# Patient Record
Sex: FEMALE | Employment: FULL TIME | URBAN - METROPOLITAN AREA
[De-identification: names, ages, dates, MRNs, and addresses within clinical notes are randomized per-mention and may not be internally consistent; named-entity substitution may affect disease eponyms.]

---

## 2023-02-17 ENCOUNTER — OFFICE VISIT (OUTPATIENT)
Dept: OCCUPATIONAL MEDICINE | Age: 53
End: 2023-02-17

## 2023-02-17 VITALS — HEART RATE: 85 BPM | OXYGEN SATURATION: 96 % | TEMPERATURE: 98.5 F | RESPIRATION RATE: 18 BRPM

## 2023-02-17 DIAGNOSIS — H65.91 MIDDLE EAR EFFUSION, RIGHT: Primary | ICD-10-CM

## 2023-02-17 RX ORDER — IPRATROPIUM BROMIDE 42 UG/1
2 SPRAY, METERED NASAL 4 TIMES DAILY
COMMUNITY
Start: 2022-11-28

## 2023-02-17 RX ORDER — ESTRADIOL 0.03 MG/D
1 FILM, EXTENDED RELEASE TRANSDERMAL
COMMUNITY
Start: 2022-05-05

## 2023-02-17 RX ORDER — LEVOTHYROXINE SODIUM 0.07 MG/1
75 TABLET ORAL DAILY
COMMUNITY
Start: 2020-03-17

## 2023-02-17 RX ORDER — SUMATRIPTAN 50 MG/1
50 TABLET, FILM COATED ORAL
COMMUNITY
Start: 2022-05-03

## 2023-02-17 RX ORDER — VENLAFAXINE HYDROCHLORIDE 75 MG/1
75 CAPSULE, EXTENDED RELEASE ORAL DAILY
COMMUNITY
Start: 2023-01-04

## 2023-02-17 RX ORDER — BENZONATATE 200 MG/1
200 CAPSULE ORAL 3 TIMES DAILY PRN
COMMUNITY
Start: 2022-10-31

## 2023-02-17 RX ORDER — FLUTICASONE PROPIONATE 50 MCG
2 SPRAY, SUSPENSION (ML) NASAL DAILY
COMMUNITY
Start: 2021-12-01

## 2023-02-17 ASSESSMENT — ENCOUNTER SYMPTOMS
COUGH: 1
CHEST TIGHTNESS: 0
RHINORRHEA: 1
SHORTNESS OF BREATH: 0

## 2023-02-17 NOTE — PROGRESS NOTES
PROGRESS NOTE    SUBJECTIVE:   Mary Jo Oneal is a 46 y.o. female seen for ____. Patient presents to clinic with complaints of right ear discomfort. She states approximately 2 weeks ago she began with UR symptoms. Last Friday she began having a right ear ache that by Saturday had progressed to sharp pains that were worsened with movement. She was evaluated via [facility] and given a 7 day RX for Augmentin. She has 3 days left in to complete her course of antibiotics. She states that today she is continuing to experience right ear fullness, pressure, muffled hearing. She states she feels fluid shift with movement and at times will feel dizzy with position changes. She denies any sharp, stabbing pains or drainage in the right ear. She denies any fever. She states she still has some nasal congestion, runny nose, and sneezing. She is prone to seasonal allergies in the spring. She is taking Motrin as needed but no other OTC medications currently. Current Outpatient Medications   Medication Sig Dispense Refill    venlafaxine (EFFEXOR XR) 75 MG extended release capsule Take 75 mg by mouth daily      SUMAtriptan (IMITREX) 50 MG tablet Take 50 mg by mouth once as needed      levothyroxine (SYNTHROID) 75 MCG tablet Take 75 mcg by mouth daily      ipratropium (ATROVENT) 0.06 % nasal spray 2 sprays by Nasal route 4 times daily      fluticasone (FLONASE) 50 MCG/ACT nasal spray 2 sprays by Nasal route daily      estradiol 0.025 MG/24HR PTTW Place 1 patch onto the skin Twice a Week      benzonatate (TESSALON) 200 MG capsule Take 200 mg by mouth 3 times daily as needed       No current facility-administered medications for this visit. No Known Allergies    Social History     Tobacco Use    Smoking status: Never    Smokeless tobacco: Never   Vaping Use    Vaping Use: Never used        Review of Systems   Constitutional:  Negative for chills and fever.    HENT:  Positive for congestion, ear discharge (on sunday when lying; resolved), ear pain (right), postnasal drip, rhinorrhea and sneezing. Respiratory:  Positive for cough (resolved). Negative for chest tightness and shortness of breath. Musculoskeletal:  Negative for myalgias. Allergic/Immunologic: Positive for environmental allergies. Neurological:  Positive for dizziness (with movement). Negative for headaches. OBJECTIVE:  Pulse 85   Temp 98.5 °F (36.9 °C) (Oral)   Resp 18   SpO2 96%      No results found for this visit on 02/17/23. Physical Exam  Constitutional:       General: She is not in acute distress. Appearance: Normal appearance. She is well-developed. She is not ill-appearing. HENT:      Right Ear: Ear canal and external ear normal. A middle ear effusion (clear; all landmarks visible) is present. There is impacted cerumen (removed with ear curette; tolerated well. ). Tympanic membrane is erythematous (mild). Tympanic membrane is not retracted or bulging. Left Ear: Tympanic membrane, ear canal and external ear normal.      Nose: Mucosal edema, congestion and rhinorrhea present. Right Turbinates: Enlarged. Left Turbinates: Enlarged. Mouth/Throat:      Lips: Pink. Mouth: Mucous membranes are moist.      Pharynx: Oropharynx is clear. Posterior oropharyngeal erythema (mild) present. No pharyngeal swelling or oropharyngeal exudate. Comments: Some post nasal drip precipitated in the posterior pharynx. Cardiovascular:      Rate and Rhythm: Normal rate and regular rhythm. Pulses: Normal pulses. Pulmonary:      Effort: Pulmonary effort is normal.      Breath sounds: Normal breath sounds. Skin:     General: Skin is warm and dry. Neurological:      Mental Status: She is alert and oriented to person, place, and time.    Psychiatric:         Attention and Perception: Attention normal.         Mood and Affect: Mood and affect normal.         Speech: Speech normal.         Behavior: Behavior normal. Behavior is cooperative.         Thought Content: Thought content normal.         Cognition and Memory: Cognition and memory normal.         Judgment: Judgment normal.       ASSESSMENT and PLAN    Diagnoses and all orders for this visit:    Middle ear effusion, right      Educated patient that symptoms are likely result of fluid behind the ear that is related to her recent upper respiratory illness. Discussed with patient OTC management of symptoms including Zyrtec and Flonase daily, OTC anti-inflammatory for comfort and decongestant use conservatively as needed for next day or two, saline nasal spray, increased fluid intake, cool mist humidifier, warm steam showers, elevating the head of the bed at night, avoiding allergens. Educated to complete the course of antibiotics that she is currently prescribed.  Risks/benefits, side effects of all medications discussed. Discussed symptoms that would warrant more urgent/emergent evaluation including but not limited to fever, worsening pain, sudden relief of pain, drainage from affected ear, loss of hearing. Encouraged patient to return to clinic for any new or worsening symptoms or concerns. Patient verbalized understanding and is agreeable with the outlined plan of care.     Counseled on benefits of having a primary care provider which includes, but is not limited to, continuity of care and having a medical home when concerns arise. Also enforced that onsite clinic policy states that we are not to take the place of a primary care provider, pt verbalized understanding.     I have reviewed the patient's medication list, past medical, family, social, and surgical history in detail and updated the patient record appropriately.    Davin Zelaya, APRN - CNP

## 2023-03-03 ENCOUNTER — OFFICE VISIT (OUTPATIENT)
Dept: OCCUPATIONAL MEDICINE | Age: 53
End: 2023-03-03

## 2023-03-03 VITALS — HEART RATE: 99 BPM | OXYGEN SATURATION: 97 % | RESPIRATION RATE: 18 BRPM | TEMPERATURE: 99 F

## 2023-03-03 DIAGNOSIS — H66.91 ACUTE OTITIS MEDIA, RIGHT: Primary | ICD-10-CM

## 2023-03-03 RX ORDER — CEFDINIR 300 MG/1
300 CAPSULE ORAL 2 TIMES DAILY
Qty: 20 CAPSULE | Refills: 0 | Status: SHIPPED | OUTPATIENT
Start: 2023-03-03 | End: 2023-03-13

## 2023-03-03 ASSESSMENT — ENCOUNTER SYMPTOMS
CHEST TIGHTNESS: 0
SHORTNESS OF BREATH: 0
COUGH: 0

## 2023-03-03 NOTE — PROGRESS NOTES
PROGRESS NOTE    SUBJECTIVE:   Soy Barraza is a 46 y.o. female seen for ____. Patient presents to clinic with complaints of right ear pain. She was seen in clinic on 2/17/2023 for similar symptoms and associated upper respiratory symptoms; diagnosed with middle ear effusion at that time and encouraged OTC medication/home interventions. At the time of her visit on 2/17/2023 she was finishing a course of Augmentin that had been prescribed to her by a [facility] for her upper respiratory symptoms. On 2/22/2023 she was seen by her PCP and was subsequently treated for deferred right ear pain and effusion with a steroid dose pack. She was also given a Z-Pack at that time. She completed the steroid and Z-Pack on Monday. She states she continued to have a feeling of fullness in her right ear and muffled hearing. Yesterday she states she began having intermittent dull, aching pain in the right ear as well as intermittent \"popping\". She is continuing to have some lingering upper respiratory symptoms including nasal congestion. She is quite prone to seasonal allergies and is still taking Zyrtec and Flonase daily. Current Outpatient Medications   Medication Sig Dispense Refill    cefdinir (OMNICEF) 300 MG capsule Take 1 capsule by mouth 2 times daily for 10 days 20 capsule 0    venlafaxine (EFFEXOR XR) 75 MG extended release capsule Take 75 mg by mouth daily      SUMAtriptan (IMITREX) 50 MG tablet Take 50 mg by mouth once as needed      levothyroxine (SYNTHROID) 75 MCG tablet Take 75 mcg by mouth daily      ipratropium (ATROVENT) 0.06 % nasal spray 2 sprays by Nasal route 4 times daily      fluticasone (FLONASE) 50 MCG/ACT nasal spray 2 sprays by Nasal route daily      estradiol 0.025 MG/24HR PTTW Place 1 patch onto the skin Twice a Week      benzonatate (TESSALON) 200 MG capsule Take 200 mg by mouth 3 times daily as needed       No current facility-administered medications for this visit.       No Known Allergies    Social History     Tobacco Use    Smoking status: Never    Smokeless tobacco: Never   Vaping Use    Vaping Use: Never used        Review of Systems   Constitutional:  Negative for chills, fatigue and fever. HENT:  Positive for congestion, ear pain (fullness, intermittent dull ache; right ear) and hearing loss (right; muffled). Negative for ear discharge. Respiratory:  Negative for cough, chest tightness and shortness of breath. Musculoskeletal:  Negative for myalgias. Allergic/Immunologic: Positive for environmental allergies. Neurological:  Negative for headaches. OBJECTIVE:  Pulse 99   Temp 99 °F (37.2 °C) (Oral)   Resp 18   SpO2 97%      No results found for this visit on 03/03/23. Physical Exam  Constitutional:       General: She is not in acute distress. Appearance: Normal appearance. She is well-developed. She is not ill-appearing. HENT:      Right Ear: Ear canal and external ear normal. Tenderness present. No drainage. A middle ear effusion (purulent fluid behind TM) is present. Tympanic membrane is erythematous and bulging (mildly). Left Ear: Tympanic membrane, ear canal and external ear normal.      Nose: Mucosal edema, congestion and rhinorrhea present. Right Turbinates: Enlarged. Left Turbinates: Enlarged. Mouth/Throat:      Lips: Pink. Mouth: Mucous membranes are moist.      Pharynx: Oropharynx is clear. Cardiovascular:      Rate and Rhythm: Normal rate and regular rhythm. Pulses: Normal pulses. Pulmonary:      Effort: Pulmonary effort is normal.      Breath sounds: Normal breath sounds. Skin:     General: Skin is warm and dry. Neurological:      Mental Status: She is alert and oriented to person, place, and time.    Psychiatric:         Attention and Perception: Attention and perception normal.         Mood and Affect: Mood and affect normal.         Speech: Speech normal.         Behavior: Behavior normal. Behavior is cooperative. Thought Content: Thought content normal.         Cognition and Memory: Cognition and memory normal.         Judgment: Judgment normal.       ASSESSMENT and PLAN    Diagnoses and all orders for this visit:    Acute otitis media, right  -     cefdinir (OMNICEF) 300 MG capsule; Take 1 capsule by mouth 2 times daily for 10 days    Discussed with patient physical examination findings. Educated the need for further antibiotic therapy based on those findings and clinical presentation. Educated that since initial failure with first line treatment Augmentin and Amoxicillin, will prescribe Cefdinir BID for 10 days. Educated to take exactly as prescribed in its entirety even if symptoms improve. May take with food to reduce GI upset. Discussed risk, benefits, side effects. Encouraged to continue taking Zyrtec and Flonase daily. May take Sudafed conservatively for day or two for symptomatic relief as well as Motrin/Tylenol as needed for discomfort. Warm compresses may also be utilized. Educated that if symptoms fail to improve with current course of treatment, ENT referral will be indicated. Patient will follow up with clinic early next week to reassess. Educated to seek urgent/emergent care for any new/worsening symptoms over weekend since clinic will be unavailable. Patient verbalized understanding and is agreeable with the outlined plan of care. Counseled on benefits of having a primary care provider which includes, but is not limited to, continuity of care and having a medical home when concerns arise. Also enforced that onsite clinic policy states that we are not to take the place of a primary care provider, pt verbalized understanding. I have reviewed the patient's medication list, past medical, family, social, and surgical history in detail and updated the patient record appropriately.     Susana Harris, APRN - CNP Self

## 2023-03-06 ENCOUNTER — TELEPHONE (OUTPATIENT)
Dept: OCCUPATIONAL MEDICINE | Age: 53
End: 2023-03-06

## 2023-03-06 NOTE — TELEPHONE ENCOUNTER
Called patient to follow up after recent visit. She states her pain has resolved but she continues to have some fullness in ear. I encouraged her to finish antibiotic as prescribed and to continue with Zyrtec and Flonase daily. She verbalized understanding and will reach out to the wellness center as needed.

## 2023-03-16 ENCOUNTER — TELEPHONE (OUTPATIENT)
Dept: OCCUPATIONAL MEDICINE | Age: 53
End: 2023-03-16

## 2023-03-16 DIAGNOSIS — H93.8X1 EAR FULLNESS, RIGHT: Primary | ICD-10-CM

## 2023-03-16 NOTE — TELEPHONE ENCOUNTER
Patient reached out to clinic requesting referral to ENT. She is continuing to have fullness in her right ear despite home interventions including daily Zyrtec, daily Flonase and Sudafed as needed. She states her upper respiratory symptoms have improved. She denies any pain or drainage from the right ear, denies any fever. ENT referral placed. Educated patient to continue daily allergy medications but to discontinue Sudafed as it is not intended for long term use. [facility] should reach out regarding referral within one week. Educated to return to clinic as needed for any new or worsening symptoms/concerns.

## 2023-03-29 ENCOUNTER — OFFICE VISIT (OUTPATIENT)
Dept: ENT CLINIC | Age: 53
End: 2023-03-29
Payer: COMMERCIAL

## 2023-03-29 VITALS
WEIGHT: 146 LBS | HEIGHT: 65 IN | SYSTOLIC BLOOD PRESSURE: 100 MMHG | DIASTOLIC BLOOD PRESSURE: 70 MMHG | BODY MASS INDEX: 24.32 KG/M2

## 2023-03-29 DIAGNOSIS — H90.41 SENSORINEURAL HEARING LOSS (SNHL) OF RIGHT EAR WITH UNRESTRICTED HEARING OF LEFT EAR: ICD-10-CM

## 2023-03-29 DIAGNOSIS — J34.3 NASAL TURBINATE HYPERTROPHY: ICD-10-CM

## 2023-03-29 DIAGNOSIS — H90.3 SENSORINEURAL HEARING LOSS, BILATERAL: Primary | ICD-10-CM

## 2023-03-29 DIAGNOSIS — H91.21 SUDDEN RIGHT HEARING LOSS: Primary | ICD-10-CM

## 2023-03-29 PROCEDURE — 92567 TYMPANOMETRY: CPT | Performed by: AUDIOLOGIST

## 2023-03-29 PROCEDURE — 92557 COMPREHENSIVE HEARING TEST: CPT | Performed by: AUDIOLOGIST

## 2023-03-29 PROCEDURE — 99204 OFFICE O/P NEW MOD 45 MIN: CPT | Performed by: PHYSICIAN ASSISTANT

## 2023-03-29 RX ORDER — IPRATROPIUM BROMIDE 21 UG/1
2 SPRAY, METERED NASAL 2 TIMES DAILY
Qty: 30 ML | Refills: 2 | Status: SHIPPED | OUTPATIENT
Start: 2023-03-29 | End: 2023-06-27

## 2023-03-29 RX ORDER — PREDNISONE 20 MG/1
TABLET ORAL
Qty: 30 TABLET | Refills: 0 | Status: SHIPPED | OUTPATIENT
Start: 2023-03-29 | End: 2023-04-11

## 2023-03-29 ASSESSMENT — ENCOUNTER SYMPTOMS
EYE DISCHARGE: 0
COUGH: 0
ABDOMINAL PAIN: 0
SINUS PRESSURE: 1

## 2023-03-29 NOTE — PROGRESS NOTES
AUDIOLOGY EVALUATION    Mary Jo Oneal had Tympanometry and Audiometry performed today. The patient reports hearing loss. Results as follows:    Tympanometry    Type A -  bilaterally    Audiometry    Test Performed - Comprehensive Audiogram    Type of Loss - Right Ear: abnormal hearing: degree of loss is normal to moderate sensorineural hearing loss                           Left Ear: abnormal hearing: degree of loss is normal to mild sensorineural hearing loss     SRT   Measurement Right Ear Left Ear   Value 40 25   Unit dB dB     Discrimination  Measurement Right Ear Left Ear   Value 92% 100%   Unit dB dB     Recommend  Further testing due to asymmetry and amplification following medical clearance    A.  [id number] [facility]  Audiologist

## 2023-03-29 NOTE — PROGRESS NOTES
Andra Enamorado is a 46 y.o. female presents today with c/o sudden hearing loss in the right ear 6 weeks ago. The patient sneezed Friday night and experienced pain and a sudden loss of hearing in her right ear. With it, right sided facial numbness. She was having sinus drainage after cutting the grass earlier that day but experienced almost complete hearing loss on the right side. She was seen by [facility] and prescribed Augmentin, Flonase, Atrovent but her hearing did not improve. So she then followed up with her primary care in person and was prescribed a Z-Jhon, 6-day taper of prednisone, after examination indicated cerumen impactions once removed with flushing erythematous eardrums on both sides. She experienced no improvement in her hearing but the feeling came back on the right side of her face. She was then seen by a nurse practitioner who prescribed her ciprofloxacin after examining her ears to find them still erythematous. The pain stopped after utilizing Zyrtec and Sudafed daily. Patient had a number of ear infections as a child and was treated with BMT and adenoidectomy before eventually having a tonsil and adenoidectomy. She has had no infections as an adult. And she felt like her hearing is unrestricted prior to this incident. Audiogram:     Left ear: Normal hearing sloping to mild snhl  Right ear: Normal hearing sloping to moderate snhl  Discrimination score: left ear 100 % and right ear 92 %  Tympanogram: left ear Type A and right ear Type A. Chief Complaint   Patient presents with    Ear Problem    Sinus Problem     Patient here for hearing loss that started about 6 weeks ago. She reports that she has some R ear pain and her sinus's are acting up with all the pollen. There is no problem list on file for this patient.        Reviewed and updated this visit by provider:  Tobacco  Allergies  Meds  Problems  Med Hx  Surg Hx  Fam Hx         Review of Systems   Constitutional:

## 2023-04-10 DIAGNOSIS — J01.00 ACUTE NON-RECURRENT MAXILLARY SINUSITIS: ICD-10-CM

## 2023-04-14 LAB
BACTERIA SPEC CULT: ABNORMAL
GRAM STN SPEC: ABNORMAL
GRAM STN SPEC: ABNORMAL
SERVICE CMNT-IMP: ABNORMAL

## 2023-04-20 ENCOUNTER — TELEPHONE (OUTPATIENT)
Dept: ENT CLINIC | Age: 53
End: 2023-04-20

## 2023-05-04 ENCOUNTER — OFFICE VISIT (OUTPATIENT)
Dept: ENT CLINIC | Age: 53
End: 2023-05-04
Payer: COMMERCIAL

## 2023-05-04 VITALS
HEIGHT: 65 IN | WEIGHT: 146 LBS | SYSTOLIC BLOOD PRESSURE: 142 MMHG | DIASTOLIC BLOOD PRESSURE: 70 MMHG | BODY MASS INDEX: 24.32 KG/M2

## 2023-05-04 DIAGNOSIS — H90.3 SENSORINEURAL HEARING LOSS, BILATERAL: Primary | ICD-10-CM

## 2023-05-04 DIAGNOSIS — H65.93 BILATERAL OTITIS MEDIA WITH EFFUSION: Primary | Chronic | ICD-10-CM

## 2023-05-04 DIAGNOSIS — J34.3 NASAL TURBINATE HYPERTROPHY: Chronic | ICD-10-CM

## 2023-05-04 DIAGNOSIS — H69.83 DYSFUNCTION OF BOTH EUSTACHIAN TUBES: Chronic | ICD-10-CM

## 2023-05-04 PROCEDURE — 92557 COMPREHENSIVE HEARING TEST: CPT | Performed by: AUDIOLOGIST

## 2023-05-04 PROCEDURE — 99214 OFFICE O/P EST MOD 30 MIN: CPT | Performed by: PHYSICIAN ASSISTANT

## 2023-05-04 PROCEDURE — 92567 TYMPANOMETRY: CPT | Performed by: PHYSICIAN ASSISTANT

## 2023-05-04 RX ORDER — MONTELUKAST SODIUM 10 MG/1
10 TABLET ORAL DAILY
Qty: 30 TABLET | Refills: 2 | Status: SHIPPED | OUTPATIENT
Start: 2023-05-04

## 2023-05-04 ASSESSMENT — ENCOUNTER SYMPTOMS
EYE DISCHARGE: 0
ABDOMINAL PAIN: 0
COUGH: 0

## 2023-05-04 NOTE — PROGRESS NOTES
AUDIOLOGY EVALUATION    Garycarol Campoverde had Audiometry performed today. The patient reports decreased hearing. Results as follows: Audiometry    Test Performed - Comprehensive Audiogram    Type of Loss - Right Ear: abnormal hearing: degree of loss is normal to mild sensorineural hearing loss                           Left Ear: abnormal hearing: degree of loss is normal to mild sensorineural hearing loss     SRT   Measurement Right Ear Left Ear   Value 25 25   Unit dB dB     Discrimination  Measurement Right Ear Left Ear   Value 100% 100%   Unit dB dB     Recommend  Annual audios    A.  [id number] [employer]  Audiologist

## 2024-03-21 NOTE — TELEPHONE ENCOUNTER
Called and spoke to the pt, culture results in scant:     HAEMOPHILUS INFLUENZAE BETA LACTAMASE POSITIVE Abnormal     Pt was worse over the weekend, so she went to [facility] and was treated with 10 d of Augmentin. I will see her back after that course. Pt agrees. Please see the attached refill request.

## 2025-02-21 ENCOUNTER — OFFICE VISIT (OUTPATIENT)
Age: 55
End: 2025-02-21

## 2025-02-21 VITALS
RESPIRATION RATE: 16 BRPM | SYSTOLIC BLOOD PRESSURE: 124 MMHG | HEART RATE: 86 BPM | OXYGEN SATURATION: 98 % | DIASTOLIC BLOOD PRESSURE: 78 MMHG | TEMPERATURE: 98.2 F

## 2025-02-21 DIAGNOSIS — J20.9 ACUTE BRONCHITIS, UNSPECIFIED ORGANISM: Primary | ICD-10-CM

## 2025-02-21 DIAGNOSIS — J01.00 ACUTE MAXILLARY SINUSITIS, RECURRENCE NOT SPECIFIED: ICD-10-CM

## 2025-02-21 DIAGNOSIS — J98.01 COUGH DUE TO BRONCHOSPASM: ICD-10-CM

## 2025-02-21 RX ORDER — AMOXICILLIN 500 MG/1
500 CAPSULE ORAL 2 TIMES DAILY
Qty: 14 CAPSULE | Refills: 0 | Status: SHIPPED | OUTPATIENT
Start: 2025-02-21 | End: 2025-02-28

## 2025-02-21 RX ORDER — ALBUTEROL SULFATE 90 UG/1
2 INHALANT RESPIRATORY (INHALATION) EVERY 6 HOURS PRN
Qty: 18 G | Refills: 0 | Status: SHIPPED | OUTPATIENT
Start: 2025-02-21 | End: 2025-03-23

## 2025-02-21 ASSESSMENT — ENCOUNTER SYMPTOMS
CHEST TIGHTNESS: 1
SINUS PAIN: 1
WHEEZING: 0
SORE THROAT: 0
TROUBLE SWALLOWING: 0
SINUS PRESSURE: 1
RHINORRHEA: 1
COUGH: 1

## 2025-02-21 NOTE — PROGRESS NOTES
Inhale 2 puffs into the lungs every 6 hours as needed for Wheezing or Shortness of Breath    Cough due to bronchospasm  -     albuterol sulfate HFA (PROVENTIL;VENTOLIN;PROAIR) 108 (90 Base) MCG/ACT inhaler; Inhale 2 puffs into the lungs every 6 hours as needed for Wheezing or Shortness of Breath    Acute maxillary sinusitis, recurrence not specified  -     amoxicillin (AMOXIL) 500 MG capsule; Take 1 capsule by mouth 2 times daily for 7 days    Albuterol inhaler as directed as needed for cough caused by bronchospasms    Start and finish antibiotics as prescribed    Take the medication as prescribed with food. Drink plenty of fluids, at least eight 8 oz glasses of water daily.  Symptom Management:  Sinus rinses with a Neti-pot using distilled water or water that has been boiled then cooled  Saline nasal sprays  Over the counter dextromethorphan for cough or guaifenesin (Mucinex) to thin and more easily expel mucous. Mucinex-DM contains both ingredients. Take according to package instructions.  Tylenol 500-1,000 mg every 8 hours (do not exceed 3,000 mg in 24 hrs),   Or ibuprofen 200-400 mg every 6-8 hours (do not exceed 2400 mg in 24 hours)   You may alternate between Tylenol and ibuprofen every 3 hours if needed.       Symptom Management:  For postnasal drip/sinus congestion:  Sinus rinses with a Neti-pot using distilled water or water that has been boiled then cooled  Saline nasal sprays  Flonase, 2 sprays in each nostril daily. Use after showering or nasal irrigation for maximum effectiveness.  Decongestants such as pseudoephedrine (Sudafed) or medications containing phenylephrine (DayQuil/NyQuil and other OTC cold medications)  For cough:  Non-pharmacologic options such as throat lozenges, hot tea, and honey  Over the counter dextromethorphan for cough or guaifenesin (Mucinex) to thin and more easily expel mucous. Mucinex-DM contains both ingredients. Take according to package instructions.     For fevers:  Tylenol

## 2025-02-25 ENCOUNTER — OFFICE VISIT (OUTPATIENT)
Age: 55
End: 2025-02-25

## 2025-02-25 VITALS
OXYGEN SATURATION: 97 % | HEART RATE: 80 BPM | SYSTOLIC BLOOD PRESSURE: 136 MMHG | RESPIRATION RATE: 14 BRPM | TEMPERATURE: 98.3 F | DIASTOLIC BLOOD PRESSURE: 94 MMHG

## 2025-02-25 DIAGNOSIS — J01.00 ACUTE MAXILLARY SINUSITIS, RECURRENCE NOT SPECIFIED: Primary | ICD-10-CM

## 2025-02-25 DIAGNOSIS — R06.2 WHEEZING: ICD-10-CM

## 2025-02-25 DIAGNOSIS — J98.01 COUGH DUE TO BRONCHOSPASM: ICD-10-CM

## 2025-02-25 RX ORDER — BENZONATATE 200 MG/1
200 CAPSULE ORAL 3 TIMES DAILY PRN
Qty: 30 CAPSULE | Refills: 0 | Status: SHIPPED | OUTPATIENT
Start: 2025-02-25 | End: 2025-03-07

## 2025-02-25 RX ORDER — PREDNISONE 20 MG/1
40 TABLET ORAL DAILY
Qty: 10 TABLET | Refills: 0 | Status: SHIPPED | OUTPATIENT
Start: 2025-02-25 | End: 2025-03-02

## 2025-02-25 RX ORDER — LEVALBUTEROL TARTRATE 45 UG/1
2 AEROSOL, METERED ORAL EVERY 6 HOURS PRN
Qty: 1 EACH | Refills: 0 | Status: SHIPPED | OUTPATIENT
Start: 2025-02-25 | End: 2026-02-25

## 2025-02-25 ASSESSMENT — ENCOUNTER SYMPTOMS
SORE THROAT: 0
SHORTNESS OF BREATH: 1
WHEEZING: 1
SINUS PAIN: 1
SINUS PRESSURE: 1
HOARSE VOICE: 1
COUGH: 1

## 2025-02-25 NOTE — PROGRESS NOTES
PROGRESS NOTE    SUBJECTIVE:   Fern Wayne is a 54 y.o. female [facility] for congestion.    Chief Complaint    Congestion           Sinusitis  This is a recurrent (The patient was seen on 2/21/25 for sinusitis and treated with amoxicillin and albuterol inhaler.) problem. The current episode started 1 to 4 weeks ago (The patient tested positive for influenza A on 2/6/25). The problem has been gradually worsening (Symptoms worsening, persistent green nasal secretions dispite starting antibiotics 4 days ago.) since onset. The maximum temperature recorded prior to her arrival was 100.4 - 100.9 F. The fever has been present for 1 to 2 days. The pain is mild. Associated symptoms include congestion, coughing, ear pain, headaches, a hoarse voice, shortness of breath and sinus pressure. Pertinent negatives include no sore throat. (wheezing) Past treatments include antibiotics, oral decongestants and lying down (Flonase, advil cold and sinus, increased fluids). The treatment provided no relief.       Current Outpatient Medications   Medication Sig Dispense Refill    benzonatate (TESSALON) 200 MG capsule Take 1 capsule by mouth 3 times daily as needed for Cough 30 capsule 0    amoxicillin-clavulanate (AUGMENTIN) 875-125 MG per tablet Take 1 tablet by mouth 2 times daily for 7 days 14 tablet 0    predniSONE (DELTASONE) 20 MG tablet Take 2 tablets by mouth daily for 5 days 10 tablet 0    levalbuterol (XOPENEX HFA) 45 MCG/ACT inhaler Inhale 2 puffs into the lungs every 6 hours as needed for Wheezing 1 each 0    montelukast (SINGULAIR) 10 MG tablet Take 1 tablet by mouth daily 30 tablet 2    ipratropium (ATROVENT) 0.03 % nasal spray 2 sprays by Each Nostril route 2 times daily 30 mL 2    venlafaxine (EFFEXOR XR) 75 MG extended release capsule Take 1 capsule by mouth daily      SUMAtriptan (IMITREX) 50 MG tablet Take 1 tablet by mouth once as needed